# Patient Record
(demographics unavailable — no encounter records)

---

## 2024-10-29 NOTE — ASSESSMENT
[FreeTextEntry1] : Ms. Garcia is a 39 year old female with history of massive weight loss who presents with excess abdominal skin and tissue in the lower abdomen and flanks that has bothered her for many years.  She was seen in consultation for possible abdominoplasty.  We discussed abdominoplasty in great detail.  We discussed the risks, benefits, and alternatives including but not limited to risks of anesthesia, bleeding, hematoma, seroma, infection, unsightly scarring or scar widening, wound healing problems or dehiscence, DVT and PE.  We also discussed changes in sensation that could be present.  We discussed muscle plication and the pros and cons of doing so.  We also discussed the postoperative recovery protocol.  We discussed limiting strenuous activity for 4-6 weeks as well as the use of compression garments.  We discussed edema and that postoperative swelling can be present for at least 3 months and up to 12 months after surgery.  We also discussed the expectation of at least some bruising.  She expressed understanding and wishes to proceed.  All questions were answered.

## 2024-10-29 NOTE — PHYSICAL EXAM
[NI] : Normal [de-identified] : Soft with skin laxity above and below the umbilicus with overhanging pannus; rectus diastasis; stria are present; no hernias present

## 2024-10-29 NOTE — HISTORY OF PRESENT ILLNESS
[FreeTextEntry1] : Ms. Garcia is a 39 year old female with history of massive weight loss who presents with excess skin and tissue of the abdomen and flanks . She has been bothered by this excess tissue for many years. She had a gastric sleeve surgery in August, 2022 and has lost 80 pounds. She is at her goal weight and has been stable at this weight for over 1 year. She is also bothered by the excess skin and tissue on her arms but would first like to take care of the excess tissue on her abdomen.     PMH Denies  PSH Gastric sleeve 8/3/2022  Meds Denies  Smoking Status Nonsmoker  Hx of blood clots Denies personal or family history of blood clots